# Patient Record
Sex: FEMALE | URBAN - METROPOLITAN AREA
[De-identification: names, ages, dates, MRNs, and addresses within clinical notes are randomized per-mention and may not be internally consistent; named-entity substitution may affect disease eponyms.]

---

## 2020-12-03 ENCOUNTER — WEB ENCOUNTER (OUTPATIENT)
Dept: URBAN - METROPOLITAN AREA CLINIC 82 | Facility: CLINIC | Age: 35
End: 2020-12-03

## 2020-12-03 ENCOUNTER — DASHBOARD ENCOUNTERS (OUTPATIENT)
Age: 35
End: 2020-12-03

## 2020-12-03 ENCOUNTER — WEB ENCOUNTER (OUTPATIENT)
Dept: URBAN - METROPOLITAN AREA CLINIC 42 | Facility: CLINIC | Age: 35
End: 2020-12-03

## 2020-12-03 ENCOUNTER — OFFICE VISIT (OUTPATIENT)
Dept: URBAN - METROPOLITAN AREA CLINIC 82 | Facility: CLINIC | Age: 35
End: 2020-12-03
Payer: COMMERCIAL

## 2020-12-03 DIAGNOSIS — K86.1 CHRONIC PANCREATITIS, UNSPECIFIED PANCREATITIS TYPE: ICD-10-CM

## 2020-12-03 DIAGNOSIS — R10.11 RUQ PAIN: ICD-10-CM

## 2020-12-03 PROBLEM — 235494005: Status: ACTIVE | Noted: 2020-12-03

## 2020-12-03 PROCEDURE — 99204 OFFICE O/P NEW MOD 45 MIN: CPT | Performed by: INTERNAL MEDICINE

## 2020-12-03 PROCEDURE — G8483 FLU IMM NO ADMIN DOC REA: HCPCS | Performed by: INTERNAL MEDICINE

## 2020-12-03 RX ORDER — DICYCLOMINE HYDROCHLORIDE 20 MG/1
1 TABLET TABLET ORAL
Qty: 120 | Refills: 3 | OUTPATIENT
Start: 2020-12-03

## 2020-12-03 NOTE — HPI-TODAY'S VISIT:
The patient is a 45-year-old female with a recent hospitalization for alcoholic pancreatitis.  She was admitted in October for pancreatitis and alcohol withdrawal.  She did leave AMA after several days of hospitalization.  Since discharge she has been doing fine.  She does not drink alcohol anymore but still smokes.  She complains mainly of intermittent severe right upper quadrant abdominal pain.  This pain is not associated with eating or bowel movements.  She denies any change in bowel habits and denies blood in the stool.  She over has a good appetite and is able to eat without any problems.  She denies any nausea vomiting.

## 2020-12-14 ENCOUNTER — TELEPHONE ENCOUNTER (OUTPATIENT)
Dept: URBAN - METROPOLITAN AREA CLINIC 82 | Facility: CLINIC | Age: 35
End: 2020-12-14

## 2020-12-17 ENCOUNTER — TELEPHONE ENCOUNTER (OUTPATIENT)
Dept: URBAN - METROPOLITAN AREA CLINIC 82 | Facility: CLINIC | Age: 35
End: 2020-12-17

## 2020-12-17 ENCOUNTER — TELEPHONE ENCOUNTER (OUTPATIENT)
Dept: URBAN - METROPOLITAN AREA CLINIC 42 | Facility: CLINIC | Age: 35
End: 2020-12-17

## 2021-01-14 ENCOUNTER — OFFICE VISIT (OUTPATIENT)
Dept: URBAN - METROPOLITAN AREA CLINIC 82 | Facility: CLINIC | Age: 36
End: 2021-01-14

## 2021-01-14 RX ORDER — DICYCLOMINE HYDROCHLORIDE 20 MG/1
1 TABLET TABLET ORAL
Qty: 120 | Refills: 3 | Status: ACTIVE | COMMUNITY
Start: 2020-12-03

## 2023-12-28 ENCOUNTER — HOSPITAL ENCOUNTER (EMERGENCY)
Facility: HOSPITAL | Age: 38
Discharge: HOME | End: 2023-12-28
Payer: COMMERCIAL

## 2023-12-28 VITALS
SYSTOLIC BLOOD PRESSURE: 129 MMHG | WEIGHT: 149 LBS | OXYGEN SATURATION: 98 % | RESPIRATION RATE: 18 BRPM | BODY MASS INDEX: 25.44 KG/M2 | TEMPERATURE: 98.9 F | HEART RATE: 98 BPM | DIASTOLIC BLOOD PRESSURE: 86 MMHG | HEIGHT: 64 IN

## 2023-12-28 DIAGNOSIS — G62.9 NEUROPATHY: Primary | ICD-10-CM

## 2023-12-28 LAB
ALBUMIN SERPL BCP-MCNC: 4.4 G/DL (ref 3.4–5)
ALP SERPL-CCNC: 63 U/L (ref 33–110)
ALT SERPL W P-5'-P-CCNC: 44 U/L (ref 7–45)
ANION GAP SERPL CALC-SCNC: 19 MMOL/L (ref 10–20)
AST SERPL W P-5'-P-CCNC: 59 U/L (ref 9–39)
BASOPHILS # BLD AUTO: 0.04 X10*3/UL (ref 0–0.1)
BASOPHILS NFR BLD AUTO: 0.3 %
BILIRUB DIRECT SERPL-MCNC: 0.1 MG/DL (ref 0–0.3)
BILIRUB SERPL-MCNC: 0.4 MG/DL (ref 0–1.2)
BUN SERPL-MCNC: 4 MG/DL (ref 6–23)
CALCIUM SERPL-MCNC: 9.3 MG/DL (ref 8.6–10.3)
CHLORIDE SERPL-SCNC: 103 MMOL/L (ref 98–107)
CO2 SERPL-SCNC: 25 MMOL/L (ref 21–32)
CREAT SERPL-MCNC: 0.43 MG/DL (ref 0.5–1.05)
EOSINOPHIL # BLD AUTO: 0.11 X10*3/UL (ref 0–0.7)
EOSINOPHIL NFR BLD AUTO: 0.9 %
ERYTHROCYTE [DISTWIDTH] IN BLOOD BY AUTOMATED COUNT: 13.6 % (ref 11.5–14.5)
GFR SERPL CREATININE-BSD FRML MDRD: >90 ML/MIN/1.73M*2
GLUCOSE SERPL-MCNC: 100 MG/DL (ref 74–99)
HCT VFR BLD AUTO: 41.7 % (ref 36–46)
HGB BLD-MCNC: 13.7 G/DL (ref 12–16)
IMM GRANULOCYTES # BLD AUTO: 0.03 X10*3/UL (ref 0–0.7)
IMM GRANULOCYTES NFR BLD AUTO: 0.2 % (ref 0–0.9)
LYMPHOCYTES # BLD AUTO: 7.63 X10*3/UL (ref 1.2–4.8)
LYMPHOCYTES NFR BLD AUTO: 63.2 %
MCH RBC QN AUTO: 33.9 PG (ref 26–34)
MCHC RBC AUTO-ENTMCNC: 32.9 G/DL (ref 32–36)
MCV RBC AUTO: 103 FL (ref 80–100)
MONOCYTES # BLD AUTO: 0.54 X10*3/UL (ref 0.1–1)
MONOCYTES NFR BLD AUTO: 4.5 %
NEUTROPHILS # BLD AUTO: 3.73 X10*3/UL (ref 1.2–7.7)
NEUTROPHILS NFR BLD AUTO: 30.9 %
NRBC BLD-RTO: 0 /100 WBCS (ref 0–0)
PLATELET # BLD AUTO: 522 X10*3/UL (ref 150–450)
POTASSIUM SERPL-SCNC: 3.5 MMOL/L (ref 3.5–5.3)
PROT SERPL-MCNC: 8.3 G/DL (ref 6.4–8.2)
RBC # BLD AUTO: 4.04 X10*6/UL (ref 4–5.2)
SODIUM SERPL-SCNC: 143 MMOL/L (ref 136–145)
WBC # BLD AUTO: 12.1 X10*3/UL (ref 4.4–11.3)

## 2023-12-28 PROCEDURE — 82248 BILIRUBIN DIRECT: CPT | Performed by: PHYSICIAN ASSISTANT

## 2023-12-28 PROCEDURE — 99283 EMERGENCY DEPT VISIT LOW MDM: CPT

## 2023-12-28 PROCEDURE — 84425 ASSAY OF VITAMIN B-1: CPT | Performed by: PHYSICIAN ASSISTANT

## 2023-12-28 PROCEDURE — 36415 COLL VENOUS BLD VENIPUNCTURE: CPT | Performed by: PHYSICIAN ASSISTANT

## 2023-12-28 PROCEDURE — 80048 BASIC METABOLIC PNL TOTAL CA: CPT | Performed by: PHYSICIAN ASSISTANT

## 2023-12-28 PROCEDURE — 85025 COMPLETE CBC W/AUTO DIFF WBC: CPT | Performed by: PHYSICIAN ASSISTANT

## 2023-12-28 PROCEDURE — 99284 EMERGENCY DEPT VISIT MOD MDM: CPT | Performed by: PHYSICIAN ASSISTANT

## 2023-12-28 PROCEDURE — 99284 EMERGENCY DEPT VISIT MOD MDM: CPT

## 2023-12-28 RX ORDER — LANOLIN ALCOHOL/MO/W.PET/CERES
100 CREAM (GRAM) TOPICAL DAILY
Qty: 30 TABLET | Refills: 0 | Status: SHIPPED | OUTPATIENT
Start: 2023-12-28 | End: 2024-01-27

## 2023-12-28 ASSESSMENT — PAIN SCALES - GENERAL
PAINLEVEL_OUTOF10: 0 - NO PAIN
PAINLEVEL_OUTOF10: 0 - NO PAIN

## 2023-12-28 ASSESSMENT — LIFESTYLE VARIABLES
REASON UNABLE TO ASSESS: NO
HAVE YOU EVER FELT YOU SHOULD CUT DOWN ON YOUR DRINKING: NO
EVER HAD A DRINK FIRST THING IN THE MORNING TO STEADY YOUR NERVES TO GET RID OF A HANGOVER: NO
HAVE PEOPLE ANNOYED YOU BY CRITICIZING YOUR DRINKING: NO
EVER FELT BAD OR GUILTY ABOUT YOUR DRINKING: NO

## 2023-12-28 ASSESSMENT — COLUMBIA-SUICIDE SEVERITY RATING SCALE - C-SSRS
6. HAVE YOU EVER DONE ANYTHING, STARTED TO DO ANYTHING, OR PREPARED TO DO ANYTHING TO END YOUR LIFE?: NO
2. HAVE YOU ACTUALLY HAD ANY THOUGHTS OF KILLING YOURSELF?: NO
1. IN THE PAST MONTH, HAVE YOU WISHED YOU WERE DEAD OR WISHED YOU COULD GO TO SLEEP AND NOT WAKE UP?: NO

## 2023-12-28 ASSESSMENT — PAIN - FUNCTIONAL ASSESSMENT
PAIN_FUNCTIONAL_ASSESSMENT: 0-10
PAIN_FUNCTIONAL_ASSESSMENT: 0-10

## 2023-12-28 NOTE — ED NOTES
Pt is very disoriented unable to focus. Pt stating she has hx of alcoholism last drinnk nov 20. Has been having numbness in her toes for 1 month and legs for aa week. States she has been falling a lot. She had an appt with [facility] but wanted to get seen sooner     Beverly Bourgeois RN  12/28/23 [id number]

## 2023-12-29 NOTE — ED PROVIDER NOTES
"HPI   Chief Complaint   Patient presents with    Extremity Weakness     Pt c/o legs \"giving out\" on her.        Patient is a 38-year-old female who presents today for evaluation of extremity weakness however patient is not actually having extremity weakness but having numbness and tingling to her toes.  Patient is 38 years old, has been drinking alcohol for the past 10 years, she was sober from November of last year into November of this year, was admitted this year for pancreatitis, states that soon after she started drinking again, patient states she drinks daily, today had half a water bottle was worth of the vodka, this is typical for her, she is not currently clinically intoxicated nor does she feel intoxicated, she has a safe ride home.  Patient states she is not at the point where she feels like she can undergo treatment, declined resources here, states that she was previously in rehabilitation and when she was ready she knows where to go to get help with her alcoholism.  Patient states that what brought her in today as she is been noticing for the past month or so that she has numbness and tingling in all 5 of her toes on both feet.  She states it seems like it is moving up into the feet from the toes.  She states she noticed this before at 1 point during her episode of sobriety, states that she is not sure if it has been ongoing for longer or whether she just noticed it because she was sober.  Patient denies any weakness, she is not falling over, not dizzy or ataxic, she is not confused, is alert and oriented x 4.  She denies any history of HIV, she has not been sexually active in 20 years and denies any history of drug use or IV drug use aside from marijuana in her teens.                          Gautam Coma Scale Score: 15                  Patient History   History reviewed. No pertinent past medical history.  History reviewed. No pertinent surgical history.  No family history on file.  Social History "     Tobacco Use    Smoking status: Never    Smokeless tobacco: Never   Vaping Use    Vaping Use: Every day   Substance Use Topics    Alcohol use: Not on file     Comment: hx of alcohol abuse; pt states she has not had etoh in the past month    Drug use: Not on file       Physical Exam   ED Triage Vitals   Temp Heart Rate Resp BP   12/28/23 1544 12/28/23 1544 12/28/23 1544 12/28/23 1544   36.9 °C (98.4 °F) 99 18 (!) 144/93      SpO2 Temp Source Heart Rate Source Patient Position   12/28/23 1544 12/28/23 1544 12/28/23 1830 12/28/23 1544   98 % Temporal Monitor Sitting      BP Location FiO2 (%)     12/28/23 1544 --     Right arm        Physical Exam  Vitals and nursing note reviewed.   Constitutional:       General: She is not in acute distress.     Appearance: Normal appearance. She is not toxic-appearing.   HENT:      Head: Normocephalic and atraumatic.      Nose: Nose normal.   Eyes:      Extraocular Movements: Extraocular movements intact.   Cardiovascular:      Rate and Rhythm: Normal rate and regular rhythm.   Pulmonary:      Effort: Pulmonary effort is normal.   Abdominal:      Palpations: Abdomen is soft.   Musculoskeletal:         General: Normal range of motion.      Cervical back: Normal range of motion and neck supple.   Skin:     General: Skin is warm and dry.   Neurological:      General: No focal deficit present.      Mental Status: She is alert and oriented to person, place, and time.      GCS: GCS eye subscore is 4. GCS verbal subscore is 5. GCS motor subscore is 6.      Cranial Nerves: Cranial nerves 2-12 are intact. No cranial nerve deficit, dysarthria or facial asymmetry.      Sensory: Sensation is intact. No sensory deficit.      Motor: Motor function is intact. No weakness, tremor or seizure activity.      Comments: Normal sensation to feet and toes   Psychiatric:         Mood and Affect: Mood normal.         Thought Content: Thought content normal.       No orders to display     Labs Reviewed    CBC WITH AUTO DIFFERENTIAL - Abnormal       Result Value    WBC 12.1 (*)     nRBC 0.0      RBC 4.04      Hemoglobin 13.7      Hematocrit 41.7       (*)     MCH 33.9      MCHC 32.9      RDW 13.6      Platelets 522 (*)     Neutrophils % 30.9      Immature Granulocytes %, Automated 0.2      Lymphocytes % 63.2      Monocytes % 4.5      Eosinophils % 0.9      Basophils % 0.3      Neutrophils Absolute 3.73      Immature Granulocytes Absolute, Automated 0.03      Lymphocytes Absolute 7.63 (*)     Monocytes Absolute 0.54      Eosinophils Absolute 0.11      Basophils Absolute 0.04     BASIC METABOLIC PANEL - Abnormal    Glucose 100 (*)     Sodium 143      Potassium 3.5      Chloride 103      Bicarbonate 25      Anion Gap 19      Urea Nitrogen 4 (*)     Creatinine 0.43 (*)     eGFR >90      Calcium 9.3     HEPATIC FUNCTION PANEL - Abnormal    Albumin 4.4      Bilirubin, Total 0.4      Bilirubin, Direct 0.1      Alkaline Phosphatase 63      ALT 44      AST 59 (*)     Total Protein 8.3 (*)    VITAMIN B1, WHOLE BLOOD         ED Course & MDM   Diagnoses as of 12/28/23 1918   Neuropathy       Medical Decision Making      MDM: Patient is a 38-year-old female with 10-year history of alcohol abuse who presents today with what sounds like neuropathy to her feet, she has no risk factors for HIV, with her history of alcohol abuse for the past 10 years, suspect thiamine deficiency, I did obtain basic labs make sure she is not hyperglycemic, her CBC CMP LFTs are unremarkable, she has a mildly elevated AST consistent with her history of alcohol abuse.  I did offer patient resources however she is not ready to quit drinking alcohol, she knows where to go for resources when she does end up needing them but states that she is not ready at this point.  Not clinically intoxicated, is alert and oriented, has a safe ride home, she is not altered in any way, I feel that she is safe and stable for discharge, she has follow-up on January 3  with [facility] with primary care.  I will start patient on thiamine given that I have a high clinical suspicion for thiamine deficiency as the cause of her neuropathy.  She has no asterixis or concern for Warnicke's or Korsakoff syndrome.  Feel that she is safe and stable for discharge at this time.        Procedure  Procedures     Orly Warner PA-C  12/28/23 1918

## 2024-01-01 LAB — VIT B1 PYROPHOSHATE BLD-SCNC: 57 NMOL/L (ref 70–180)
